# Patient Record
Sex: MALE | Race: WHITE | NOT HISPANIC OR LATINO | Employment: FULL TIME | URBAN - METROPOLITAN AREA
[De-identification: names, ages, dates, MRNs, and addresses within clinical notes are randomized per-mention and may not be internally consistent; named-entity substitution may affect disease eponyms.]

---

## 2017-02-24 ENCOUNTER — HOSPITAL ENCOUNTER (OUTPATIENT)
Dept: SLEEP MEDICINE | Facility: HOSPITAL | Age: 52
Discharge: HOME | End: 2017-02-24
Admitting: NURSE PRACTITIONER

## 2017-02-24 PROCEDURE — G0463 HOSPITAL OUTPT CLINIC VISIT: HCPCS

## 2017-02-28 NOTE — PROGRESS NOTES
DATE OF VISIT:  02/24/2017    PRIMARY CARE PHYSICIAN: Vikram Bueno MD    REFERRING PHYSICIAN:  Machelle Boyd MD    REASON FOR VISIT:  Annual followup for obstructive sleep apnea.     HISTORY OF PRESENT ILLNESS:   The patient is a very pleasant 51-year-old male who is here today for an annual followup on obstructive sleep apnea.  He uses his CPAP device every night.  He goes to bed at 9-10 p.m. and is up at 5 a.m.  He gets 5-6 hours of sleep per night. He usually wakes up feeling rested in the morning. He denies snoring and daytime sleepiness on the CPAP device. His current CPAP device is 1-1/2 years old. He replaces his CPAP accessories at regular intervals.  He did gain 15 pounds in the past 5 years, but other than that, he is doing well and has not had any changes to his health.     CURRENT MEDICATIONS:  1. Lisinopril.    2. Atorvastatin.    SOCIAL HISTORY:   He is a .  Drinks 1-2 drinks of alcohol a week. Drinks 4 sodas a day.     REVIEW OF SYSTEMS: Significant for painful joints and muscles. [place] Sleepiness Scale score today is 5.     PHYSICAL EXAMINATION:  VITAL SIGNS: Weight 320 pounds.  Blood pressure 108/75, heart rate 70.   HEENT:  Class IV Mallampati airway. No evidence of exudates.  NECK:  Trachea midline.  LUNGS: Respiratory effort nonlabored.   HEART: Regular rate and rhythm. No murmurs or gallops.   ABDOMEN: Soft and nontender. Bowel sounds are present.    EXTREMITIES:  No evidence of edema. Pedal pulses positive.     DIAGNOSTIC DATA: Download dates 01/25/2017 through 02/23/2017 show 100% compliance with average use of 6 hours 9 minutes on auto CPAP with average pressures of 14.3 and AHI of 1.7. Patient settings are currently set at 10-20 cm auto. Central apnea index is 1.1, which is normal.     ASSESSMENT:  1. Obstructive sleep apnea.   2. Obesity.   3. Hypertension.     PLAN:  I asked the patient to continue and change his CPAP accessories at regular intervals for optimal  benefit. He is using the device and benefits from its use in terms of reduction of hypersomnia and snoring. Weight loss will be beneficial in order to reduce the severity of sleep-disorder breathing.  His blood pressure is currently adequately controlled through his primary care physician.    I asked him to follow up with Dr. Monroe Orlando in 1 year to see how he is doing.     I appreciate the opportunity of participating in this patient's care.       CHANI Chen  AZ:sagar  D:   02/27/2017 16:49:53  T:   02/28/2017 12:21:17  Job ID:   [id number]  Document ID:   [id number]  cc:   Machelle Boyd MD

## 2018-02-23 ENCOUNTER — OFFICE VISIT (OUTPATIENT)
Dept: SLEEP MEDICINE | Facility: HOSPITAL | Age: 53
End: 2018-02-23
Attending: INTERNAL MEDICINE

## 2018-02-23 VITALS
DIASTOLIC BLOOD PRESSURE: 68 MMHG | HEIGHT: 72 IN | HEART RATE: 69 BPM | WEIGHT: 315 LBS | BODY MASS INDEX: 42.66 KG/M2 | SYSTOLIC BLOOD PRESSURE: 109 MMHG

## 2018-02-23 DIAGNOSIS — G47.33 OBSTRUCTIVE SLEEP APNEA, ADULT: Primary | ICD-10-CM

## 2018-02-23 DIAGNOSIS — E66.01 MORBID OBESITY WITH BODY MASS INDEX OF 40.0-49.9 (HCC): ICD-10-CM

## 2018-02-23 PROCEDURE — G0463 HOSPITAL OUTPT CLINIC VISIT: HCPCS

## 2018-02-23 RX ORDER — LISINOPRIL 10 MG/1
10 TABLET ORAL DAILY
COMMUNITY

## 2018-02-23 RX ORDER — ATORVASTATIN CALCIUM 10 MG/1
10 TABLET, FILM COATED ORAL DAILY
COMMUNITY

## 2018-02-23 NOTE — PROGRESS NOTES
"[facility]  Telephone: [phone] / Fax: [phone] [place]  Telephone: [phone] / Fax: [phone] Veronica Calvo    PCP: Machelle Boyd MD    Reason for visit: INDRA f/u    Jatin Ruiz was last seen at [facility] sleep lab 1 year ago. He goes to bed 10pm and is up 4-5am. He wakes up feeling rested and notices improvement in EDS with CPAP. We were unable to d/l card from [facility] today and he did not bring card for review.  He is trying to get 7-10 hours of sleep per night. His ESS is 6.  He uses nasal pillows and they fit well. His DME is [facility].     SH- no tobacco, drinks 0-2 drinks per day, no energy drinks.    ROS- negative.    Current Medications:    Current Outpatient Prescriptions:   •  atorvastatin (LIPITOR) 10 MG tablet, Take 10 mg by mouth Daily., Disp: , Rfl:   •  lisinopril (PRINIVIL,ZESTRIL) 10 MG tablet, Take 10 mg by mouth Daily., Disp: , Rfl:    also entered in Sleep Questionnaire    Patient  has no past medical history on file.    I have reviewed the Past Medical History, Past Surgical History, Social History and Family History.            ESS  6   Vital Signs Vitals:    02/23/18 0938   BP: 109/68   BP Location: Left arm   Patient Position: Sitting   Cuff Size: Adult   Pulse: 69   Weight: (!) 143 kg (316 lb 4 oz)   Height: 181.6 cm (71.5\")    Body mass index is 43.49 kg/(m^2).    General Alert and oriented. No acute distress noted   Pharynx/Throat Class IV Mallampati airway, large tongue, no evidence of redundant lateral pharyngeal tissue. No oral lesions. No thrush. Moist mucous membranes..   Head Normocephalic. Symmetrical. Atraumatic.    Nose No septal deviation. No drainage   Chest Wall Normal shape. Symmetric expansion with respiration. No tenderness.   Neck Trachea midline, no thyromegaly or adenopathy    Lungs Clear to auscultation bilaterally. No wheezes. No rhonchi. No rales. Respirations regular, even and unlabored.   Heart Regular rhythm and normal rate. Normal S1 " and S2. No murmur   Abdomen Soft, non-tender and non-distended. Normal bowel sounds. No masses.   Extremities Moves all extremities well. No edema   Psychiatric Normal mood and affect.     Testing:  · Download unavailable.    · PSG 2009 [place]- AHI 15.9.      Impression:  1. Obstructive sleep apnea, adult    2. Morbid obesity with body mass index of 40.0-49.9          Plan:  I will request [facility] to send us copy of latest 90d smart card d/l for review  I reviewed last year's data. He was and still is on auto CPAP 10-20cm with avg pr 14. Current pressures feel comfortable.    Patient uses the CPAP device and benefits from its use in terms of reduction of hypersomnia and snoring.Weight loss will be strongly beneficial to reduce the severity of sleep-disordered breathing.  Caution during activities that require prolonged concentration is strongly advised if sleepiness returns. Changing of PAP supplies regularly is important for effective use. Patient needs to change cushion on the mask or plugs on nasal pillows along with disposable filters once every month and change mask frame, tubing, headgear and Velcro straps every 6 months at the minimum.       Follow up with Dr. Orlando in one year    Thank you for allowing me to participate in your patient's care.      CHANI Chen  [facility]  Phone: [phone]      Part of this note may be an electronic transcription/translation of spoken language to printed text using the Dragon Dictation System.

## 2018-03-02 ENCOUNTER — DOCUMENTATION (OUTPATIENT)
Dept: SLEEP MEDICINE | Facility: HOSPITAL | Age: 53
End: 2018-03-02

## 2018-03-02 NOTE — PROGRESS NOTES
Download received from 11/29/17 through 2/26/18.  Average usage for days use 5 hours 50 minutes.  Compliance 98.9%.  Average CPAP pressure to stand to 12 cm.  Set CPAP pressure is 10-20 cm.  AHI 2.7.

## 2019-02-22 ENCOUNTER — OFFICE VISIT (OUTPATIENT)
Dept: SLEEP MEDICINE | Facility: HOSPITAL | Age: 54
End: 2019-02-22
Attending: INTERNAL MEDICINE

## 2019-02-22 VITALS
OXYGEN SATURATION: 96 % | SYSTOLIC BLOOD PRESSURE: 116 MMHG | BODY MASS INDEX: 42.66 KG/M2 | HEART RATE: 69 BPM | DIASTOLIC BLOOD PRESSURE: 77 MMHG | HEIGHT: 72 IN | WEIGHT: 315 LBS

## 2019-02-22 DIAGNOSIS — G47.33 OBSTRUCTIVE SLEEP APNEA, ADULT: Primary | ICD-10-CM

## 2019-02-22 DIAGNOSIS — E66.01 MORBID OBESITY WITH BODY MASS INDEX OF 40.0-49.9 (HCC): ICD-10-CM

## 2019-02-22 PROCEDURE — G0463 HOSPITAL OUTPT CLINIC VISIT: HCPCS

## 2019-02-22 NOTE — PROGRESS NOTES
"[facility]  Telephone: [phone] / Fax: [phone] [place]  Telephone: [phone] / Fax: [phone] Veronica Calvo    PCP: Machelle Boyd MD    Reason for visit: INDRA f/u    Jatin Ruiz is a 53 y.o.male  was last seen at [facility] sleep lab 1 year ago. He is doing great. No snoring or gasping respirations reported on the device.  Current pressures appear to be comfortable. He is on auto 10-20cm. He uses nasal pillows but the size is too big and he needs a smaller size. [facility] His sleep schedule is [id number]. He wakes up feeling rested.    SH- 1-2 alc beverages per day, 0 energy drinks, 3-6 coffee per day.    ROS- negative      Current Medications:    Current Outpatient Medications:   •  atorvastatin (LIPITOR) 10 MG tablet, Take 10 mg by mouth Daily., Disp: , Rfl:   •  lisinopril (PRINIVIL,ZESTRIL) 10 MG tablet, Take 10 mg by mouth Daily., Disp: , Rfl:    also entered in Sleep Questionnaire    Patient  has no past medical history on file.    I have reviewed the Past Medical History, Past Surgical History, Social History and Family History.            ESS  8   Vital Signs /77   Pulse 69   Ht 181.6 cm (71.5\")   Wt (!) 150 kg (331 lb)   SpO2 96%   BMI 45.52 kg/m²  Body mass index is 45.52 kg/m².    General Alert and oriented. No acute distress noted   Pharynx/Throat Class IV Mallampati airway, large tongue, no evidence of redundant lateral pharyngeal tissue. No oral lesions. No thrush. Moist mucous membranes.   Head Normocephalic. Symmetrical. Atraumatic.    Nose No septal deviation. No drainage   Chest Wall Normal shape. Symmetric expansion with respiration. No tenderness.   Neck Trachea midline, no thyromegaly or adenopathy    Lungs Clear to auscultation bilaterally. No wheezes. No rhonchi. No rales. Respirations regular, even and unlabored.   Heart Regular rhythm and normal rate. Normal S1 and S2. No murmur   Abdomen Soft, non-tender and non-distended. " Normal bowel sounds. No masses.   Extremities Moves all extremities well. No edema   Psychiatric Normal mood and affect.     Testing:  · Download 11/24/18-2/21/19 100% use with average nightly use of 6 hours and 11 min on auto CPAP 10-20cm avg pr 13.5, AHI 3.0    · PSG 2009 [place]- AHI 15.9.     Impression:  1. Obstructive sleep apnea, adult    2. Morbid obesity with body mass index of 40.0-49.9 (CMS/HCC)          Plan:  Patient uses the CPAP device and benefits from its use in terms of reduction of hypersomnia and snoring. I will ask [facility] to fit him with a smaller size nasal pillows. Weight loss will be strongly beneficial to reduce the severity of sleep-disordered breathing.  Caution during activities that require prolonged concentration is strongly advised if sleepiness returns. Changing of PAP supplies regularly is important for effective use. Patient needs to change cushion on the mask or plugs on nasal pillows along with disposable filters once every month and change mask frame, tubing, headgear and Velcro straps every 6 months at the minimum.       Follow up with Dr. Orlando in one year    Thank you for allowing me to participate in your patient's care.      CHANI Chen  [facility]  Phone: [phone]      Part of this note may be an electronic transcription/translation of spoken language to printed text using the Dragon Dictation System.

## 2020-02-21 ENCOUNTER — OFFICE VISIT (OUTPATIENT)
Dept: SLEEP MEDICINE | Facility: HOSPITAL | Age: 55
End: 2020-02-21

## 2020-02-21 VITALS
HEIGHT: 72 IN | BODY MASS INDEX: 42.66 KG/M2 | DIASTOLIC BLOOD PRESSURE: 80 MMHG | SYSTOLIC BLOOD PRESSURE: 137 MMHG | HEART RATE: 70 BPM | WEIGHT: 315 LBS | OXYGEN SATURATION: 97 %

## 2020-02-21 DIAGNOSIS — E66.01 MORBID OBESITY WITH BODY MASS INDEX OF 40.0-49.9 (HCC): ICD-10-CM

## 2020-02-21 DIAGNOSIS — G47.33 OBSTRUCTIVE SLEEP APNEA, ADULT: Primary | ICD-10-CM

## 2020-02-21 PROCEDURE — G0463 HOSPITAL OUTPT CLINIC VISIT: HCPCS

## 2020-02-21 NOTE — PROGRESS NOTES
"[facility]  Telephone: [phone] / Fax: [phone] [place]  Telephone: [phone] / Fax: [phone] Veronica Calvo    PCP: Machelle Boyd MD    Reason for visit: INDRA f/u    Jatin Ruiz is a 54 y.o.male  was last seen at [facility] sleep lab 1 year ago. He is recovering from the flu and is already feeling better. His sleep schedule is [id number]. His ESS is 5. He uses the CPAP device with pressure of 10-20cm and benefits. Sleep quality has improved in comparison to prior treatment, and excessive sleepiness/snoring is no longer an issue.  There is no complaint of aerophagia, excessive dry mouth or air leak.     SH- no tobacco, drinks 1-2 alc per week, 1-2 bottles of coffee per day, no energy drinks.    ROS- +cough. Rest is negative.    [facility]    Current Medications:    Current Outpatient Medications:   •  atorvastatin (LIPITOR) 10 MG tablet, Take 10 mg by mouth Daily., Disp: , Rfl:   •  lisinopril (PRINIVIL,ZESTRIL) 10 MG tablet, Take 10 mg by mouth Daily., Disp: , Rfl:    also entered in Sleep Questionnaire    Patient  has no past medical history on file.    I have reviewed the Past Medical History, Past Surgical History, Social History and Family History.    Vital Signs /80   Pulse 70   Ht 181.6 cm (71.5\")   Wt (!) 153 kg (337 lb)   SpO2 97%   BMI 46.35 kg/m²  Body mass index is 46.35 kg/m².    General Alert and oriented. No acute distress noted   Pharynx/Throat Class IV Mallampati airway, large tongue, no evidence of redundant lateral pharyngeal tissue. No oral lesions. No thrush. Moist mucous membranes.   Head Normocephalic. Symmetrical. Atraumatic.    Nose No septal deviation. No drainage   Chest Wall Normal shape. Symmetric expansion with respiration. No tenderness.   Neck Trachea midline, no thyromegaly or adenopathy    Lungs Clear to auscultation bilaterally. No wheezes. No rhonchi. No rales. Respirations regular, even and unlabored.   Heart Regular " rhythm and normal rate. Normal S1 and S2. No murmur   Abdomen Soft, non-tender and non-distended. Normal bowel sounds. No masses.   Extremities Moves all extremities well. No edema   Psychiatric Normal mood and affect.     Testing:  · Download 11/23/19-2/20/20- 100% use with average nightly use of 6 hours and 37 minutes on auto CPAP 10-20cm with AHI 2.7. Avg pressure of 14.1.    · Study-PSG 2009 [place]- AHI 15.9.       Impression:  1. Obstructive sleep apnea, adult    2. Morbid obesity with body mass index of 40.0-49.9 (CMS/[facility])          Plan:  Patient is doing great on CPAP device!     He uses the CPAP device and benefits from its use in terms of reduction of hypersomnia and snoring.  AHI appears to be within adequate range. I reviewed download report and original sleep study report with the patient.     Weight loss will be strongly beneficial to reduce the severity of sleep-disordered breathing.  Caution during activities that require prolonged concentration is strongly advised if sleepiness returns. Changing of PAP supplies regularly is important for effective use.  Nasal mask frame should be changed every 3 months, nasal mask cushion twice per month and headgear every 6 months. Full face mask frame should be replaced every 3 months, full face cushion monthly and headgear every 6 months.    Follow up with Dr. Orlando in one year    Thank you for allowing me to participate in your patient's care.      CHANI Chen  [facility]  Phone: [phone]      Part of this note may be an electronic transcription/translation of spoken language to printed text using the Dragon Dictation System.

## 2021-02-19 ENCOUNTER — OFFICE VISIT (OUTPATIENT)
Dept: SLEEP MEDICINE | Facility: HOSPITAL | Age: 56
End: 2021-02-19

## 2021-02-19 VITALS
SYSTOLIC BLOOD PRESSURE: 142 MMHG | DIASTOLIC BLOOD PRESSURE: 79 MMHG | HEIGHT: 72 IN | HEART RATE: 79 BPM | WEIGHT: 315 LBS | OXYGEN SATURATION: 97 % | BODY MASS INDEX: 42.66 KG/M2

## 2021-02-19 DIAGNOSIS — E66.01 OBESITY, MORBID, BMI 40.0-49.9 (HCC): ICD-10-CM

## 2021-02-19 DIAGNOSIS — G47.33 OBSTRUCTIVE SLEEP APNEA: Primary | ICD-10-CM

## 2021-02-19 PROCEDURE — G0463 HOSPITAL OUTPT CLINIC VISIT: HCPCS

## 2021-02-19 NOTE — PROGRESS NOTES
"[facility]  [address] [facility]  [address]RD [address]LO[address]  [facility] [address]  [phone]    PCP: Machelle Boyd MD    Reason for visit:  Sleep disorders: INDRA    Jatin is a 55 y.o.male who was seen in the Sleep Disorders Center today. Annual fu. He is doing well. His current device is from 2017. He sleeps from 10pm to 5:30 am. Nasal mask, fits well, no air leaks or dry mouth. He wakes up rested and refreshed. No EDS.  [place] Sleepiness Scale is 3. Caffeine 2 per day. Alcohol 1-2 per week.    Jatin  reports that he has never smoked. He has never used smokeless tobacco.    Pertinent Positive Review of Systems of denies  Rest of Review of Systems was negative as recorded in Sleep Questionnaire.    Patient  has no past medical history on file.     Current Medications:    Current Outpatient Medications:   •  atorvastatin (LIPITOR) 10 MG tablet, Take 10 mg by mouth Daily., Disp: , Rfl:   •  lisinopril (PRINIVIL,ZESTRIL) 10 MG tablet, Take 10 mg by mouth Daily., Disp: , Rfl:    also entered in Sleep Questionnaire         Vital Signs: /79   Pulse 79   Ht 181.6 cm (71.5\")   Wt (!) 156 kg (345 lb)   SpO2 97%   BMI 47.45 kg/m²     Body mass index is 47.45 kg/m².       Tongue: Large       Dentition: good       Pharynx: Posterior pharyngeal pillars are unable to see   Mallampatti: IV (only hard palate visible)        General: Alert. Cooperative. Well developed. No acute distress.             Head:  Normocephalic. Symmetrical. Atraumatic.              Nose: No septal deviation. No drainage.          Throat: No oral lesions. No thrush. Moist mucous membranes.    Chest Wall:  Normal shape. Symmetric expansion with respiration. No tenderness.             Neck:  Trachea midline.           Lungs:  Clear to auscultation bilaterally. No wheezes. No rhonchi. No rales. Respirations regular, even and unlabored.            Heart:  Regular rhythm and normal rate. Normal S1 and S2. No murmur.     Abdomen:  Soft, " non-tender and non-distended. Normal bowel sounds. No masses.  Extremities:  Moves all extremities well. No edema.    Psychiatric: Normal mood and affect.    Diagnostic data available to date is as below and was reviewed on current visit:  11/28/2009 at [facility]  · AHI 15.9.  RDI 39.9.  12/28/2009 at [facility]  · BiPAP titration attempted but no final optimal pressure.    Most current available usage data reviewed on 02/19/2021:  · 100% compliance average usage 6 hours 42 minutes AHI 1.6 average pressure 11 to 13 cm    DME Company: [employer]    Impression:  1. Obstructive sleep apnea    2. Obesity, morbid, BMI 40.0-49.9 (CMS/[facility])        Plan:  Jatin is compliant with device and benefits.  He wakes up rested and refreshed.  He replaces supplies regularly.  No daytime sleepiness.  His current device is from 2017.  He will be due for replacement in approximately 2 years.    I reiterated the importance of effective treatment of obstructive sleep apnea with PAP machine.  Cardiovascular health risks of untreated sleep apnea were again reviewed.  Patient was asked to remain cautious if there is persistent hypersomnolence. The benefit of weight loss in reducing severity of obstructive sleep apnea was discussed.  Patient would benefit from adhering to a strict diet to achieve ideal BMI.     Change of PAP supplies regularly is important for effective use.  Change of cushion on the mask or plugs on nasal pillows along with disposable filters once every month and change of mask frame, tubing, headgear and Velcro straps every 6 months at the minimum was reiterated.    This patient is compliant with PAP machine and benefits from its use.  Apnea hypopneas index is corrected/improved.  Daytime hypersomnolence has resolved.     Patient will follow up in this clinic in 1 year APRN    Thank you for allowing me to participate in your patient's care.    Electronically signed by Monroe Orlando MD, 02/19/21, 9:13 AM EST.    Part  of this note may be an electronic transcription/translation of spoken language to printed text using the Dragon Dictation System.

## 2022-02-18 ENCOUNTER — OFFICE VISIT (OUTPATIENT)
Dept: SLEEP MEDICINE | Facility: HOSPITAL | Age: 57
End: 2022-02-18

## 2022-02-18 VITALS
HEART RATE: 71 BPM | WEIGHT: 315 LBS | SYSTOLIC BLOOD PRESSURE: 131 MMHG | HEIGHT: 71 IN | OXYGEN SATURATION: 96 % | DIASTOLIC BLOOD PRESSURE: 61 MMHG | BODY MASS INDEX: 44.1 KG/M2

## 2022-02-18 DIAGNOSIS — G47.33 OBSTRUCTIVE SLEEP APNEA: Primary | ICD-10-CM

## 2022-02-18 DIAGNOSIS — E66.01 MORBID OBESITY WITH BODY MASS INDEX OF 40.0-49.9: ICD-10-CM

## 2022-02-18 PROCEDURE — G0463 HOSPITAL OUTPT CLINIC VISIT: HCPCS

## 2022-02-18 NOTE — PROGRESS NOTES
"[facility]  Telephone: [phone] / Fax: [phone] [place]  Telephone: [phone] / Fax: [phone] Veronica Calvo    PCP: Machelle Boyd MD    Reason for visit: INDRA f/u    Jatin Ruiz is a 56 y.o.male  was last seen at [facility] sleep lab 1 year ago. Here to review his progress on the machine. He registered the device with [facility] and is pending replacement.  He is eligible for replacement this year. I will place order for a new machine. Pressures are set at 10-20cm H2O. They appear adequate. His sleep schedule is 9-10pm and awake time is 5:30am. ESS is 4.    SH- no tobacco, drinks 2-4 alc per week, 3-4 bottles per day, no energy drinks.    ROS-+post nasal drip. Rest is negative.    DME [facility]    Current Medications:    Current Outpatient Medications:   •  atorvastatin (LIPITOR) 10 MG tablet, Take 10 mg by mouth Daily., Disp: , Rfl:   •  lisinopril (PRINIVIL,ZESTRIL) 10 MG tablet, Take 10 mg by mouth Daily., Disp: , Rfl:    also entered in Sleep Questionnaire    Patient  has no past medical history on file.    I have reviewed the Past Medical History, Past Surgical History, Social History and Family History.    Vital Signs /61   Pulse 71   Ht 181.6 cm (71.5\")   Wt (!) 158 kg (348 lb)   SpO2 96%   BMI 47.87 kg/m²  Body mass index is 47.87 kg/m².    General Alert and oriented. No acute distress noted   Pharynx/Throat Class IV  Mallampati airway, large tongue, no evidence of redundant lateral pharyngeal tissue. No oral lesions. No thrush. Moist mucous membranes.   Head Normocephalic. Symmetrical. Atraumatic.    Nose No septal deviation. No drainage   Chest Wall Normal shape. Symmetric expansion with respiration. No tenderness.   Neck Trachea midline, no thyromegaly or adenopathy    Lungs Clear to auscultation bilaterally. No wheezes. No rhonchi. No rales. Respirations regular, even and unlabored.   Heart Regular rhythm and normal rate. Normal S1 and S2. No murmur   Abdomen " Soft, non-tender and non-distended. Normal bowel sounds. No masses.   Extremities Moves all extremities well. No edema   Psychiatric Normal mood and affect.     Testing:  · Download 11/20/21-2/17/22 98% use with average nightly use of 7 hours and 6 minutes on auto CPAP 10-20cm H2O, avg pressure of 14.9cm, AHI 1.4, leak of 0 seconds.    · Study-PSG 2009 [place]- AHI 15.9.      Impression:  1. Obstructive sleep apnea    2. Morbid obesity with body mass index of 40.0-49.9 (HCC)          Plan:  [facility]. Until then, he would like to continue using the recalled CPAP unit. He understands the small health risks associated with foam degradation particles. He has a filter on the device at this time. I reviewed original sleep study and recent download report with patient. Weight loss will be strongly beneficial. Reorder supplies through DME(over the nose mask)    Follow up with Dr. Orlando in 4-5 months    Thank you for allowing me to participate in your patient's care.      CHANI Chen  [facility]  Phone: [phone]      Part of this note may be an electronic transcription/translation of spoken language to printed text using the Dragon Dictation System.

## 2022-06-10 ENCOUNTER — APPOINTMENT (OUTPATIENT)
Dept: SLEEP MEDICINE | Facility: HOSPITAL | Age: 57
End: 2022-06-10

## 2022-08-19 ENCOUNTER — APPOINTMENT (OUTPATIENT)
Dept: SLEEP MEDICINE | Facility: HOSPITAL | Age: 57
End: 2022-08-19

## 2022-09-16 ENCOUNTER — OFFICE VISIT (OUTPATIENT)
Dept: SLEEP MEDICINE | Facility: HOSPITAL | Age: 57
End: 2022-09-16

## 2022-09-16 VITALS
OXYGEN SATURATION: 97 % | DIASTOLIC BLOOD PRESSURE: 74 MMHG | HEART RATE: 68 BPM | HEIGHT: 72 IN | WEIGHT: 315 LBS | BODY MASS INDEX: 42.66 KG/M2 | SYSTOLIC BLOOD PRESSURE: 130 MMHG

## 2022-09-16 DIAGNOSIS — G47.33 OBSTRUCTIVE SLEEP APNEA: Primary | ICD-10-CM

## 2022-09-16 DIAGNOSIS — E66.01 MORBID OBESITY WITH BODY MASS INDEX OF 40.0-49.9: ICD-10-CM

## 2022-09-16 PROCEDURE — G0463 HOSPITAL OUTPT CLINIC VISIT: HCPCS

## 2022-09-16 NOTE — PROGRESS NOTES
"[facility]  Telephone: [phone] / Fax: [phone] [place]  Telephone: [phone] / Fax: [phone] Veronica Calvo    PCP: Machelle Boyd MD    Reason for visit: INDRA f/u    Jatin Ruiz is a 57 y.o.male  was last seen at [facility] sleep lab in Feb 2022. It took awhile for him to get the machine replaced. He uses the machine and benefits. His sleep schedule is 9-10pm and awake time is 5:30am. He wakes up feeling rested. ESS is 4. Mask fits well and he denies significant leaks. He lost 30 lbs since December 2021 and is feeling better. I reviewed d/l dates 7/18/22-9/15/22- 100% use with average nightly use of 6 hours and 47 minuets on auto CPAP 10-20cm H2O, AHI 2.8, leak of 19.3 L.min.    SH- no tobacco, 1-2 alc per week,  3-4 caffeine per day    ROS negative    DME [facility]    Current Medications:    Current Outpatient Medications:   •  atorvastatin (LIPITOR) 10 MG tablet, Take 10 mg by mouth Daily., Disp: , Rfl:   •  lisinopril (PRINIVIL,ZESTRIL) 10 MG tablet, Take 10 mg by mouth Daily., Disp: , Rfl:    also entered in Sleep Questionnaire    Patient  has no past medical history on file.    I have reviewed the Past Medical History, Past Surgical History, Social History and Family History.    Vital Signs /74   Pulse 68   Ht 181.6 cm (71.5\")   Wt (!) 146 kg (322 lb)   SpO2 97%   BMI 44.28 kg/m²  Body mass index is 44.28 kg/m².    General Alert and oriented. No acute distress noted   Pharynx/Throat Class IV  Mallampati airway, large tongue, no evidence of redundant lateral pharyngeal tissue. No oral lesions. No thrush. Moist mucous membranes.   Head Normocephalic. Symmetrical. Atraumatic.    Nose No septal deviation. No drainage   Chest Wall Normal shape. Symmetric expansion with respiration. No tenderness.   Neck Trachea midline, no thyromegaly or adenopathy    Lungs Clear to auscultation bilaterally. No wheezes. No rhonchi. No rales. Respirations regular, even and unlabored. "   Heart Regular rhythm and normal rate. Normal S1 and S2. No murmur   Abdomen Soft, non-tender and non-distended. Normal bowel sounds. No masses.   Extremities Moves all extremities well. No edema   Psychiatric Normal mood and affect.     Testing:    · Study-PSG 2009 [place]- AHI 15.9.       Impression:  1. Obstructive sleep apnea    2. Morbid obesity with body mass index of 40.0-49.9 ([facility])          Plan:  Doing great on CPAP 10-20cm H2O, I reviewed original sleep study and d/l report with pt. I will send order to DME to renew supplies. He was congratulated on such significant weight loss. He will f/u with Dr. Orlando in one year    Thank you for allowing me to participate in your patient's care.      CHANI Chen  [facility]  Phone: [phone]      Part of this note may be an electronic transcription/translation of spoken language to printed text using the Dragon Dictation System.

## 2023-09-11 ENCOUNTER — OFFICE VISIT (OUTPATIENT)
Dept: SLEEP MEDICINE | Facility: HOSPITAL | Age: 58
End: 2023-09-11
Payer: COMMERCIAL

## 2023-09-11 VITALS
HEART RATE: 56 BPM | SYSTOLIC BLOOD PRESSURE: 125 MMHG | BODY MASS INDEX: 44.1 KG/M2 | OXYGEN SATURATION: 98 % | DIASTOLIC BLOOD PRESSURE: 67 MMHG | WEIGHT: 315 LBS | HEIGHT: 71 IN

## 2023-09-11 DIAGNOSIS — E66.01 MORBID OBESITY WITH BODY MASS INDEX OF 40.0-49.9: ICD-10-CM

## 2023-09-11 DIAGNOSIS — G47.33 OBSTRUCTIVE SLEEP APNEA: Primary | ICD-10-CM

## 2023-09-11 PROCEDURE — G0463 HOSPITAL OUTPT CLINIC VISIT: HCPCS

## 2023-09-11 NOTE — PROGRESS NOTES
"[facility]  Telephone: [phone] / Fax: [phone] [place]  Telephone: [phone] / Fax: [phone] Veronica Calvo    PCP: Machelle Boyd MD    Reason for visit: INDRA f/u    Jatin Ruiz is a 58 y.o.male  was last seen at [facility] sleep lab in September 2022. He is doing great on auto CPAP 10-20cm H2O. Pressures appear comfortable. He denies snoring/or apneas while on the machine. He uses nasal pillow mask. It fits well. No leaks or excessive dry mouth.He feels that sleep quality has improved on the CPAP and excessive sleepiness/snoring is no longer an issue.  There is no complaint of aerophagia. He reports intermittent dry mouth only. No significant air leak. His sleep schedule is 10pm-6am. ESS is 7. His current machine is only about 1 year old.    SH no tobacco, drinks 1-2 alc per week, 2-3 caffeine    ROS- negative.    CLOVER Musa    Current Medications:    Current Outpatient Medications:     atorvastatin (LIPITOR) 10 MG tablet, Take 10 mg by mouth Daily., Disp: , Rfl:     lisinopril (PRINIVIL,ZESTRIL) 10 MG tablet, Take 10 mg by mouth Daily., Disp: , Rfl:    also entered in Sleep Questionnaire    Patient  has no past medical history on file.    I have reviewed the Past Medical History, Past Surgical History, Social History and Family History.    Vital Signs /67   Pulse 56   Ht 181.6 cm (71.5\")   Wt (!) 159 kg (350 lb)   SpO2 98%   BMI 48.14 kg/m²  Body mass index is 48.14 kg/m².    General Alert and oriented. No acute distress noted   Pharynx/Throat Class IV  Mallampati airway, large tongue, no evidence of redundant lateral pharyngeal tissue. No oral lesions. No thrush. Moist mucous membranes.   Head Normocephalic. Symmetrical. Atraumatic.    Nose No septal deviation. No drainage   Chest Wall Normal shape. Symmetric expansion with respiration. No tenderness.   Neck Trachea midline, no thyromegaly or adenopathy    Lungs Clear to auscultation bilaterally. No wheezes. No " rhonchi. No rales. Respirations regular, even and unlabored.   Heart Regular rhythm and normal rate. Normal S1 and S2. No murmur   Abdomen Soft, non-tender and non-distended. Normal bowel sounds. No masses.   Extremities Moves all extremities well. No edema   Psychiatric Normal mood and affect.     Testing:  Download 6/13/23-9/10/23 99% use with average nightly use of 6 hours and 51 minutes on auto CPAP 10-20cm H2O, avg pr is 12.7cm H2O, leak of 5.9 Lmin.    Testing:   Study-PSG 2009 [place]- AHI 15.9.    Impression:  1. Obstructive sleep apnea    2. Morbid obesity with body mass index of 40.0-49.9          Plan:  Patient uses the CPAP device and benefits from its use in terms of reduction of hypersomnia and snoring.  AHI appears to be within adequate range. I reviewed download report and original sleep study report with the patient. I advised pt to contact us if PAP pressures feel excessive or insufficient.    Weight loss will be strongly beneficial to reduce the severity of sleep-disordered breathing.  Caution during activities that require prolonged concentration is strongly advised if sleepiness returns.       Follow up with Dr. Orlando in one year    Thank you for allowing me to participate in your patient's care.      CHANI Chen  [facility]  Phone: [phone]      Part of this note may be an electronic transcription/translation of spoken language to printed text using the Dragon Dictation System.

## 2024-09-03 ENCOUNTER — OFFICE VISIT (OUTPATIENT)
Dept: SLEEP MEDICINE | Facility: HOSPITAL | Age: 59
End: 2024-09-03
Payer: COMMERCIAL

## 2024-09-03 VITALS
HEIGHT: 72 IN | OXYGEN SATURATION: 97 % | BODY MASS INDEX: 42.66 KG/M2 | WEIGHT: 315 LBS | HEART RATE: 67 BPM | DIASTOLIC BLOOD PRESSURE: 82 MMHG | SYSTOLIC BLOOD PRESSURE: 131 MMHG

## 2024-09-03 DIAGNOSIS — G47.33 OBSTRUCTIVE SLEEP APNEA, ADULT: Primary | ICD-10-CM

## 2024-09-03 DIAGNOSIS — E66.01 OBESITY, MORBID, BMI 40.0-49.9: ICD-10-CM

## 2024-09-03 NOTE — PROGRESS NOTES
"[facility]EP [facility]IC[facility]  [address]  [address]  [place] [address] [phone]    PCP: Machelle Boyd MD    Reason for visit:  Sleep disorders: INDRA    Jatin is a 58 y.o.male who was seen in the [facility] today. Annual fu. He is doing well and is fully compliant. Sleeps from 10pm to 6am. Using nasal mask. Occasional dry mouth, does not bother him. Waking up rested and refreshed. No EDS.  [place] Sleepiness Scale is 5. Caffeine 1-2 per day. Alcohol 3-4 per week.    Jatin  reports that he has never smoked. He has never used smokeless tobacco.    Pertinent Positive Review of Systems of denies  Rest of Review of Systems was negative as recorded in Sleep Questionnaire.    Patient  has no past medical history on file. HTN    Current Medications:    Current Outpatient Medications:     atorvastatin (LIPITOR) 10 MG tablet, Take 10 mg by mouth Daily., Disp: , Rfl:     lisinopril (PRINIVIL,ZESTRIL) 10 MG tablet, Take 10 mg by mouth Daily., Disp: , Rfl:    also entered in Sleep Questionnaire         Vital Signs: /82   Pulse 67   Ht 181.6 cm (71.5\")   Wt (!) 150 kg (331 lb 3.2 oz)   SpO2 97%   BMI 45.55 kg/m²     Body mass index is 45.55 kg/m².       Tongue: Large       Dentition: good       Pharynx: Posterior pharyngeal pillars are narrow   Mallampatti: III (soft and hard palate and base of uvula visible)        General: Alert. Cooperative. Well developed. No acute distress.             Head:  Normocephalic. Symmetrical. Atraumatic.              Nose: No septal deviation. No drainage.          Throat: No oral lesions. No thrush. Moist mucous membranes.    Chest Wall:  Normal shape. Symmetric expansion with respiration. No tenderness.             Neck:  Trachea midline.           Lungs:  Clear to auscultation bilaterally. No wheezes. No rhonchi. No rales. Respirations regular, even and unlabored.            Heart:  Regular rhythm and normal rate. Normal S1 and S2. No murmur.     " "Abdomen:  Soft, non-tender and non-distended. Normal bowel sounds. No masses.  Extremities:  Moves all extremities well. No edema.    Psychiatric: Normal mood and affect.    Diagnostic data available to date is as below and was reviewed on current visit:  11/28/2009 at [facility]  AHI 15.9.  RDI 39.9.  12/28/2009 at [facility]  BiPAP titration attempted but no final optimal pressure.    No results found for: \"IRON\", \"TIBC\", \"FERRITIN\"    Most current available usage data reviewed on 09/03/2024:        [employer]: [place]    Prescription to [facility] for replacement supplies as below:    nasal mask      Description Replacement    Nasal PILLOWS      A 7034 Nasal Pillows  every 3 mth    A 7033 Repl Nasal Pillows  2 per mth    Nasal MASK/CUSHION     x A 7034 Nasal Mask/Cushion  every 3 mth   x A 7032 Repl Nasal Mask/Cushion  2 per mth    Full Face MASK      A 7030 Full Face Mask  every 3 mth    A 7031 Repl Face Mask  1 per mth      A 4604 Heated Tubing  every 3 mth    A 7037 Standard Tubing  every 3 mth   x A 7035 Headgear  every 3 mth   x A 7046 Repl Humidifier Chamber  every 6 yrs   x A 7038 Disposable Filters  2 per mth   x A 7039 Non-disposable Filter  every 6 mth   x A 7036 Chin Strap  every 6 mth     Orders Placed This Encounter   Procedures    Pulmonary Results Scan          Impression:  1. Obstructive sleep apnea, adult    2. Obesity, morbid, BMI 40.0-49.9        Plan:  Jatin is fully compliant and doing well with his CPAP machine.  He now lives in [place] and would likely provide in that area.  We can transfer records to his preferred provider.  You can come back and see us if required in the future.    I reiterated the importance of effective treatment of obstructive sleep apnea with PAP machine.  Cardiovascular health risks of untreated sleep apnea were again reviewed.  Patient was asked to remain cautious if there is persistent hypersomnolence. The benefit of weight loss in reducing severity of " obstructive sleep apnea was discussed.  Patient would benefit from adhering to a strict diet to achieve ideal BMI.     Change of PAP supplies regularly is important for effective use.  Change of cushion on the mask or plugs on nasal pillows along with disposable filters once every month and change of mask frame, tubing, headgear and Velcro straps every 6 months at the minimum was reiterated.    This patient is compliant with PAP machine and benefits from its use.  Apnea hypopneas index is corrected/improved.  Daytime hypersomnolence has resolved.     Patient will follow up in this clinic in [place].     Thank you for allowing me to participate in your patient's care.    Electronically signed by Monroe Orlando MD, 09/03/24, 12:15 PM EDT.    Part of this note may be an electronic transcription/translation of spoken language to printed text using the Dragon Dictation System.